# Patient Record
Sex: FEMALE | Race: WHITE | NOT HISPANIC OR LATINO | Employment: FULL TIME | URBAN - METROPOLITAN AREA
[De-identification: names, ages, dates, MRNs, and addresses within clinical notes are randomized per-mention and may not be internally consistent; named-entity substitution may affect disease eponyms.]

---

## 2024-06-05 ENCOUNTER — OCCMED (OUTPATIENT)
Dept: URGENT CARE | Facility: CLINIC | Age: 29
End: 2024-06-05
Payer: OTHER MISCELLANEOUS

## 2024-06-05 ENCOUNTER — APPOINTMENT (EMERGENCY)
Dept: CT IMAGING | Facility: HOSPITAL | Age: 29
End: 2024-06-05

## 2024-06-05 ENCOUNTER — HOSPITAL ENCOUNTER (EMERGENCY)
Facility: HOSPITAL | Age: 29
Discharge: HOME | End: 2024-06-05
Attending: EMERGENCY MEDICINE

## 2024-06-05 VITALS
TEMPERATURE: 97.8 F | WEIGHT: 135 LBS | DIASTOLIC BLOOD PRESSURE: 65 MMHG | HEIGHT: 58 IN | RESPIRATION RATE: 18 BRPM | HEART RATE: 74 BPM | BODY MASS INDEX: 28.34 KG/M2 | SYSTOLIC BLOOD PRESSURE: 119 MMHG | OXYGEN SATURATION: 100 %

## 2024-06-05 DIAGNOSIS — S09.90XA INJURY OF HEAD, INITIAL ENCOUNTER: Primary | ICD-10-CM

## 2024-06-05 DIAGNOSIS — S06.0XAA CONCUSSION: ICD-10-CM

## 2024-06-05 PROCEDURE — 99284 EMERGENCY DEPT VISIT MOD MDM: CPT | Performed by: EMERGENCY MEDICINE

## 2024-06-05 PROCEDURE — 99283 EMERGENCY DEPT VISIT LOW MDM: CPT

## 2024-06-05 PROCEDURE — G0382 LEV 3 HOSP TYPE B ED VISIT: HCPCS

## 2024-06-05 PROCEDURE — 70450 CT HEAD/BRAIN W/O DYE: CPT

## 2024-06-05 NOTE — Clinical Note
Noemí Middleton was seen and treated in our emergency department on 6/5/2024.                Diagnosis: head injury    Noemí  may return to work on return date.    She may return on this date: 06/06/2024         If you have any questions or concerns, please don't hesitate to call.      Frank Meyers MD    ______________________________           _______________          _______________  Hospital Representative                              Date                                Time

## 2024-06-06 NOTE — DISCHARGE INSTRUCTIONS
Follow up with jalen as discussed    A  personal message from Dr. Frank Meyers,  Thank you so much for allowing me to care for you today.    I pride myself in the care and attention I give all my patients.  I hope you were a witness to this tonight.   If for any reason your condition does not improve or worsens, or you have a question that was not answered during your visit you can feel free to text me on my personal phone #  # [phone].   I will answer to your message and continue your care past your emergency room visit.     Please understand that although you are being discharged because your condition has been deemed stable and able to be managed on an outpatient setting. However your condition may worsen as part of the natural progression of the illness/condition, if this occurs please come back to the emergency department for a repeat evaluation.

## 2024-06-06 NOTE — ED PROVIDER NOTES
History  Chief Complaint   Patient presents with    Head Injury     Hit in head with heavy tray, no loc      Last time at work around 1 AM patient was pushing a cart and a 3 pound metal tray fell from couple feet higher than her onto her head she has a frontal hematoma from that.  She feels a little lightheaded of course she has a headache.  She did have a sharp pain on her left ear that has since gotten better.  Not nauseous or vomiting.  No focal neurological signs or symptoms.  She did follow-up with occupational medicine and they sent her here to get a CAT scan.      History provided by:  Patient   used: No        None       Past Medical History:   Diagnosis Date    Polyglandular autoimmune syndrome, type 1 (HCC)        No past surgical history on file.    No family history on file.  I have reviewed and agree with the history as documented.    E-Cigarette/Vaping     E-Cigarette/Vaping Substances          Review of Systems   Constitutional:  Negative for chills and fever.   HENT:  Positive for ear pain. Negative for sore throat.    Eyes:  Negative for pain and visual disturbance.   Respiratory:  Negative for cough and shortness of breath.    Cardiovascular:  Negative for chest pain and palpitations.   Gastrointestinal:  Negative for abdominal pain and vomiting.   Genitourinary:  Negative for dysuria and hematuria.   Musculoskeletal:  Negative for arthralgias and back pain.   Skin:  Negative for color change and rash.   Neurological:  Positive for dizziness and headaches. Negative for seizures and syncope.   All other systems reviewed and are negative.      Physical Exam  Physical Exam  Vitals and nursing note reviewed.   Constitutional:       General: She is not in acute distress.     Appearance: Normal appearance. She is well-developed and normal weight.   HENT:      Head: Normocephalic and atraumatic.      Right Ear: Ear canal and external ear normal.      Left Ear: Ear canal and external  ear normal.      Ears:      Comments: R TM shows changes consistent with chronic infection but not acute      Nose: Nose normal.      Mouth/Throat:      Mouth: Mucous membranes are moist.   Eyes:      Extraocular Movements: Extraocular movements intact.      Conjunctiva/sclera: Conjunctivae normal.      Pupils: Pupils are equal, round, and reactive to light.   Cardiovascular:      Rate and Rhythm: Normal rate and regular rhythm.      Heart sounds: No murmur heard.  Pulmonary:      Effort: Pulmonary effort is normal. No respiratory distress.      Breath sounds: Normal breath sounds.   Abdominal:      General: Abdomen is flat.      Palpations: Abdomen is soft.      Tenderness: There is no abdominal tenderness.   Musculoskeletal:         General: No swelling.      Cervical back: Neck supple.   Skin:     General: Skin is warm and dry.      Capillary Refill: Capillary refill takes less than 2 seconds.   Neurological:      General: No focal deficit present.      Mental Status: She is alert and oriented to person, place, and time.   Psychiatric:         Mood and Affect: Mood normal.         Thought Content: Thought content normal.         Judgment: Judgment normal.         Vital Signs  ED Triage Vitals [06/05/24 1843]   Temperature Pulse Respirations Blood Pressure SpO2   97.8 °F (36.6 °C) 74 18 119/65 100 %      Temp Source Heart Rate Source Patient Position - Orthostatic VS BP Location FiO2 (%)   Temporal Monitor Sitting Left arm --      Pain Score       --           Vitals:    06/05/24 1843   BP: 119/65   Pulse: 74   Patient Position - Orthostatic VS: Sitting         Visual Acuity  Visual Acuity      Flowsheet Row Most Recent Value   L Pupil Size (mm) 3   R Pupil Size (mm) 3            ED Medications  Medications - No data to display    Diagnostic Studies  Results Reviewed       None                   CT head without contrast   Final Result by Luis Nash DO (06/05 2021)      No acute intracranial abnormality.  Extensive symmetric parenchymal calcifications are identified. These were described on outside MRI report dated 3/30/2012 and have been attributed to the patient's history of hypoparathyroidism.                  Workstation performed: [id number]                    Procedures  Procedures         ED Course                               SBIRT 22yo+      Flowsheet Row Most Recent Value   Initial Alcohol Screen: US AUDIT-C     1. How often do you have a drink containing alcohol? 0 Filed at: 06/05/2024 1844   2. How many drinks containing alcohol do you have on a typical day you are drinking?  0 Filed at: 06/05/2024 1844   3a. Male UNDER 65: How often do you have five or more drinks on one occasion? 0 Filed at: 06/05/2024 1844   3b. FEMALE Any Age, or MALE 65+: How often do you have 4 or more drinks on one occassion? 0 Filed at: 06/05/2024 1844   Audit-C Score 0 Filed at: 06/05/2024 1844   FREDA: How many times in the past year have you...    Used an illegal drug or used a prescription medication for non-medical reasons? Never Filed at: 06/05/2024 1844                      Medical Decision Making  Patient able to be cleared via [facility] rules:  1.) GCS 15 within 2 hours of injury.  2.) There is no open or depressed skull fracture on physical exam.  3.) No signs of basilar skull fracture.  4.) There was not more than 1 episode of vomiting.  5.) There is no retrograde amnesia to greater than 30 minutes prior to the event.  6.) No dangerous mechanism (fall greater than 3 feet or struck as pedetrian)  7.) Patient is less than 65 years old and older than 17.  8.) Patient is not on anticoagulants.      Problems Addressed:  Concussion: acute illness or injury  Injury of head, initial encounter: acute illness or injury    Amount and/or Complexity of Data Reviewed  Radiology: ordered.  Discussion of management or test interpretation with external provider(s): Because [facility] sent her here specifically for CT, I went ahead  and ordered one.              Disposition  Final diagnoses:   Injury of head, initial encounter   Concussion     Time reflects when diagnosis was documented in both MDM as applicable and the Disposition within this note       Time User Action Codes Description Comment    6/5/2024  8:32 PM Frank Meyers [S09.90XA] Injury of head, initial encounter     6/5/2024  8:32 PM Frank Meyers [S06.0XAA] Concussion           ED Disposition       ED Disposition   Discharge    Condition   Stable    Date/Time   Wed Jun 5, 2024 2032    Comment   Noemí Middleton discharge to home/self care.                   Follow-up Information    None         Patient's Medications    No medications on file       No discharge procedures on file.    PDMP Review       None            ED Provider  Electronically Signed by             Frank Meyers MD  06/05/24 2037

## 2024-06-07 ENCOUNTER — APPOINTMENT (OUTPATIENT)
Dept: URGENT CARE | Facility: CLINIC | Age: 29
End: 2024-06-07
Payer: OTHER MISCELLANEOUS

## 2024-06-07 PROCEDURE — 99213 OFFICE O/P EST LOW 20 MIN: CPT

## 2024-06-12 ENCOUNTER — APPOINTMENT (OUTPATIENT)
Dept: URGENT CARE | Facility: CLINIC | Age: 29
End: 2024-06-12
Payer: OTHER MISCELLANEOUS

## 2024-06-12 PROCEDURE — 99213 OFFICE O/P EST LOW 20 MIN: CPT

## 2024-06-18 ENCOUNTER — APPOINTMENT (OUTPATIENT)
Age: 29
End: 2024-06-18
Payer: OTHER MISCELLANEOUS

## 2024-06-18 PROCEDURE — 99213 OFFICE O/P EST LOW 20 MIN: CPT | Performed by: FAMILY MEDICINE

## 2024-07-02 ENCOUNTER — TELEPHONE (OUTPATIENT)
Age: 29
End: 2024-07-02

## 2024-07-02 NOTE — TELEPHONE ENCOUNTER
Hello Good Afternoon,       Patient is now scheduled with , 8/26/2024, [facility] .     case all  Case documents and notes have been scanned into chart and available for review.    Thank you in advance,     Renate

## 2024-07-03 ENCOUNTER — OFFICE VISIT (OUTPATIENT)
Dept: NEUROLOGY | Facility: CLINIC | Age: 29
End: 2024-07-03
Payer: OTHER MISCELLANEOUS

## 2024-07-03 VITALS
TEMPERATURE: 98.3 F | DIASTOLIC BLOOD PRESSURE: 78 MMHG | SYSTOLIC BLOOD PRESSURE: 128 MMHG | BODY MASS INDEX: 29.01 KG/M2 | HEIGHT: 58 IN | WEIGHT: 138.2 LBS

## 2024-07-03 DIAGNOSIS — H93.19 TINNITUS: ICD-10-CM

## 2024-07-03 DIAGNOSIS — S06.0X0A CONCUSSION WITHOUT LOSS OF CONSCIOUSNESS: ICD-10-CM

## 2024-07-03 DIAGNOSIS — R41.89 BRAIN FOG: ICD-10-CM

## 2024-07-03 DIAGNOSIS — G44.309 POST-TRAUMATIC HEADACHE: Primary | ICD-10-CM

## 2024-07-03 DIAGNOSIS — R47.89 WORD FINDING DIFFICULTY: ICD-10-CM

## 2024-07-03 PROCEDURE — 99205 OFFICE O/P NEW HI 60 MIN: CPT | Performed by: STUDENT IN AN ORGANIZED HEALTH CARE EDUCATION/TRAINING PROGRAM

## 2024-07-03 RX ORDER — PREDNISONE 20 MG/1
TABLET ORAL
Qty: 12 TABLET | Refills: 0 | Status: CANCELLED | OUTPATIENT
Start: 2024-07-03 | End: 2024-07-09

## 2024-07-03 RX ORDER — POTASSIUM CITRATE 10 MEQ/1
20 TABLET, EXTENDED RELEASE ORAL
COMMUNITY

## 2024-07-03 RX ORDER — MEMANTINE HYDROCHLORIDE 5 MG/1
TABLET ORAL
Qty: 120 TABLET | Refills: 4 | Status: SHIPPED | OUTPATIENT
Start: 2024-07-03

## 2024-07-03 RX ORDER — LYSINE 500 MG
500 TABLET ORAL DAILY
COMMUNITY

## 2024-07-03 RX ORDER — VALACYCLOVIR HYDROCHLORIDE 500 MG/1
500 TABLET, FILM COATED ORAL DAILY
COMMUNITY

## 2024-07-03 RX ORDER — CALCITRIOL 0.25 UG/1
0.25 CAPSULE, LIQUID FILLED ORAL 2 TIMES DAILY
COMMUNITY
Start: 2024-02-16

## 2024-07-03 RX ORDER — NAPROXEN 500 MG/1
500 TABLET ORAL 2 TIMES DAILY WITH MEALS
Qty: 14 TABLET | Refills: 0 | Status: SHIPPED | OUTPATIENT
Start: 2024-07-03 | End: 2024-07-10

## 2024-07-03 RX ORDER — CALCIUM CARBONATE 500 MG/1
1 TABLET, CHEWABLE ORAL 2 TIMES DAILY
COMMUNITY

## 2024-07-03 RX ORDER — AMILORIDE HYDROCHLORIDE AND HYDROCHLOROTHIAZIDE 5; 50 MG/1; MG/1
1 TABLET ORAL DAILY
COMMUNITY

## 2024-07-03 RX ORDER — CHLORAL HYDRATE 500 MG
1000 CAPSULE ORAL DAILY
COMMUNITY

## 2024-07-03 NOTE — PATIENT INSTRUCTIONS
Activity Plan:  General counseling as discussed regarding appropriate level of tolerable physical activity.      Gradual return to physical activity. It is ok to push through light symptoms, we just don't want to significantly exacerbate symptoms.     Additional Testing or Referrals:   - Occupational therapy  - ENT referral  - Labs: B12, Folate, TSH, Iron panel    Headache/migraine treatment:   Abortive medications (for immediate treatment of a headache): Ok to take ibuprofen or acetaminophen for headaches, but try to limit the amount and frequency that you are taking to avoid medication overuse/rebound headache. Ideally no more than 2-3 days per week.    Bridge  Naproxen Bridge  Take 500mg Naproxen twice daily for 7 days  - Avoid other NSAIDs during this time  - Take with food or milk    Prescription preventive medications for headaches/migraines   (to take every day to help prevent headaches - not to take at the time of headache):  Memantine  Week 1: 5mg in the evening  Week 2: 5mg in the morning and 5mg in the evening  Week 3: 5mg in the morning and 10mg in the evening  Week 4: 10mg in the morning and 10mg in the evening - continue going forward    This may help with:  [x] Headache prevention   [] Sleep   [] Mood/Emotion [x] Cognition/Brain fog    *Typically these types of medications take time untill you see the benefit, although some may see improvement in days, often it may take weeks, especially if the medication is being titrated up to a beneficial level. Please contact us if there are any concerns or questions regarding the medication.     Lifestyle Recommendations:  - Maintain good sleep hygiene.  Going to bed and waking up at consistent times, avoiding excessive daytime naps, avoiding caffeinated beverages in the evening, avoid excessive stimulation in the evening and generally using bed primarily for sleeping.  One hour before bedtime would recommend turning lights down lower, decreasing your activity  (may read quietly, listen to music at a low volume). When you get into bed, should eliminate all technology (no texting, emailing, playing with your phone, iPad or tablet in bed).  - Maintain good hydration. Drink  2L of fluid a day (4 typical small water bottles)  - Maintain good nutrition. In particular don't skip meals and eat balanced meals regularly.    Education and Follow-up  - Please contact us if any questions or concerns arise. Of course, try to protect yourself from head injuries, and if any new concerning symptoms or significant blow to the head or body go to the emergency department.  - Follow up in 3 months

## 2024-07-03 NOTE — PROGRESS NOTES
Review of Systems   Constitutional:  Negative for appetite change, fatigue and fever.   HENT:  Positive for ear pain (happens during PT) and tinnitus (ongoing since incident). Negative for hearing loss, trouble swallowing and voice change.    Eyes:  Positive for photophobia. Negative for pain and visual disturbance.   Respiratory: Negative.  Negative for shortness of breath.    Cardiovascular: Negative.  Negative for palpitations.   Gastrointestinal: Negative.  Negative for nausea and vomiting.   Endocrine: Negative.  Negative for cold intolerance.   Genitourinary: Negative.  Negative for dysuria, frequency and urgency.   Musculoskeletal:  Negative for back pain, gait problem, myalgias, neck pain and neck stiffness.   Skin: Negative.  Negative for rash.   Allergic/Immunologic: Negative.    Neurological:  Positive for headaches (daily; worse in light and on extertion). Negative for dizziness, tremors, seizures, syncope, facial asymmetry, speech difficulty, weakness, light-headedness and numbness.   Hematological: Negative.  Does not bruise/bleed easily.   Psychiatric/Behavioral:  Positive for agitation (irritable since incident) and confusion (brain fog). Negative for hallucinations and sleep disturbance.    All other systems reviewed and are negative.
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
## 2024-07-26 DIAGNOSIS — R47.89 WORD FINDING DIFFICULTY: ICD-10-CM

## 2024-07-26 DIAGNOSIS — R41.89 BRAIN FOG: ICD-10-CM

## 2024-07-26 DIAGNOSIS — S06.0X0A CONCUSSION WITHOUT LOSS OF CONSCIOUSNESS: ICD-10-CM

## 2024-07-26 DIAGNOSIS — G44.309 POST-TRAUMATIC HEADACHE: ICD-10-CM

## 2024-07-26 RX ORDER — MEMANTINE HYDROCHLORIDE 5 MG/1
TABLET ORAL
Qty: 360 TABLET | Refills: 1 | Status: SHIPPED | OUTPATIENT
Start: 2024-07-26

## 2024-09-27 ENCOUNTER — OFFICE VISIT (OUTPATIENT)
Age: 29
End: 2024-09-27
Payer: COMMERCIAL

## 2024-09-27 VITALS
RESPIRATION RATE: 18 BRPM | TEMPERATURE: 98.1 F | HEIGHT: 58 IN | HEART RATE: 94 BPM | DIASTOLIC BLOOD PRESSURE: 62 MMHG | OXYGEN SATURATION: 95 % | WEIGHT: 138 LBS | SYSTOLIC BLOOD PRESSURE: 118 MMHG | BODY MASS INDEX: 28.97 KG/M2

## 2024-09-27 DIAGNOSIS — B96.89 LOCALIZED BACTERIAL SKIN INFECTION: Primary | ICD-10-CM

## 2024-09-27 DIAGNOSIS — L08.9 LOCALIZED BACTERIAL SKIN INFECTION: Primary | ICD-10-CM

## 2024-09-27 PROCEDURE — G0382 LEV 3 HOSP TYPE B ED VISIT: HCPCS | Performed by: PHYSICIAN ASSISTANT

## 2024-09-27 RX ORDER — SULFAMETHOXAZOLE/TRIMETHOPRIM 800-160 MG
1 TABLET ORAL EVERY 12 HOURS SCHEDULED
Qty: 20 TABLET | Refills: 0 | Status: SHIPPED | OUTPATIENT
Start: 2024-09-27 | End: 2024-10-07

## 2024-09-27 NOTE — PROGRESS NOTES
[facility]        NAME: Noemí Middleton is a 28 y.o. female  : 1995    MRN: [id number]  DATE: 2024  TIME: 11:03 AM    Assessment and Plan   Localized bacterial skin infection [L08.9, B96.89]  1. Localized bacterial skin infection  sulfamethoxazole-trimethoprim (Bactrim DS) 800-160 mg per tablet          Patient has 3 areas of induration 1 in the left axilla, 1 in the left cheek lateral to the corner of the mouth but not involving the mouth itself.  There is no swelling inside the mouth or oropharynx.  No evidence of intraoral infection.  There is also area of induration in the mons pubis.  All of these are not fluctuant.  There is no significant erythema there is some mild swelling in the facial area however.  Based upon the appearance and palpation not appear to be amenable to I&D at this time there was no purulence able to be expressed from any of these areas.  Recommend treatment with warm compresses oral antibiotics at this time if these lesions worsen and swelling redness pain or she develops fever chills recommend evaluation in the ED.    The patient and/or parent/legal guardian verbalized understanding of exam findings and   Treatment plan. We engaged in the shared decision-making process and treatment options were   discussed at length with the patient.  All questions, concerns and complaints were answered and   addressed to the patient's' and/or parent/legal guardians's satisfaction.    Patient Instructions   There are no Patient Instructions on file for this visit.    Follow up with PCP in 3-5 days.  Proceed to  ER if symptoms worsen.    If tests are performed, our office will contact you with results only if   changes need to made to the care plan discussed with you at the visit.   [facility]     Chief Complaint     Chief Complaint   Patient presents with    Swelling     Patient complains of lump of left armpit, swelling, redness  and pus started 2 weeks ago. . Swelling of left cheek, started a day ago.          History of Present Illness       HPI  Patient is complaining of a lump of the left armpit along with swelling redness and pus this all began about 2 weeks ago she also complains that her left cheek is swelling this began about a day ago. Has history of polyglandular autoimmune syndrome type 1.  No fevers or chills.    Review of Systems   Review of Systems  All other related systems reviewed and are negative except as noted in HPI    Current Medications       Current Outpatient Medications:     AMILoride-hydrochlorothiazide (MODURETIC) 5-50 mg per tablet, Take 1 tablet by mouth daily, Disp: , Rfl:     calcitriol (Rocaltrol) 0.25 mcg capsule, Take 0.25 mcg by mouth 2 (two) times a day, Disp: , Rfl:     calcium carbonate (TUMS) 500 mg chewable tablet, Chew 1 tablet 2 (two) times a day, Disp: , Rfl:     L-Lysine 500 MG TABS, Take 500 mg by mouth in the morning, Disp: , Rfl:     Omega-3 Fatty Acids (fish oil) 1,000 mg, Take 1,000 mg by mouth daily, Disp: , Rfl:     Parathyroid Hormone, Recomb, (NATPARA SC), Take 75 mg by mouth in the morning, Disp: , Rfl:     potassium citrate (UROCIT-K) 10 mEq, Take 20 mEq by mouth 3 (three) times a day with meals, Disp: , Rfl:     sulfamethoxazole-trimethoprim (Bactrim DS) 800-160 mg per tablet, Take 1 tablet by mouth every 12 (twelve) hours for 10 days, Disp: 20 tablet, Rfl: 0    valACYclovir (VALTREX) 500 mg tablet, Take 500 mg by mouth daily, Disp: , Rfl:     memantine (NAMENDA) 5 mg tablet, TAKE 1 TABLET BY MOUTH DAILY FOR 1 WEEK, THEN 1 TAB TWICE A DAY FOR 1 WEEK, THEN 1 TAB IN THE MORNING AND 2 TABS IN THE EVENING FOR 1 WEEK THEN 2 TABS TWICE A DAY (Patient not taking: Reported on 9/27/2024), Disp: 360 tablet, Rfl: 1    naproxen (Naprosyn) 500 mg tablet, Take 1 tablet (500 mg total) by mouth 2 (two) times a day with meals for 7 days, Disp: 14 tablet, Rfl: 0    Current Allergies     Allergies as of  "09/27/2024    (No Known Allergies)            The following portions of the patient's history were reviewed and updated as appropriate: allergies, current medications, past family history, past medical history, past social history, past surgical history and problem list.     Past Medical History:   Diagnosis Date    Polyglandular autoimmune syndrome, type 1 (HCC)        No past surgical history on file.    No family history on file.      Medications have been verified.        Objective   /62   Pulse 94   Temp 98.1 °F (36.7 °C)   Resp 18   Ht 4' 10\" (1.473 m)   Wt 62.6 kg (138 lb)   SpO2 95%   BMI 28.84 kg/m²   No LMP recorded.       Physical Exam     Physical Exam  Constitutional:       General: She is not in acute distress.     Appearance: She is well-developed.   HENT:      Head: Normocephalic and atraumatic.   Eyes:      General: No scleral icterus.     Conjunctiva/sclera: Conjunctivae normal.   Neck:      Trachea: No tracheal deviation.   Pulmonary:      Effort: Pulmonary effort is normal. No respiratory distress.      Breath sounds: No stridor.   Musculoskeletal:      Cervical back: Normal range of motion.   Skin:     General: Skin is warm and dry.      Findings: Erythema and lesion present.      Comments: There is an area of subcutaneous induration and mild erythema in the left axilla, the left mons pubis, also left cheek there is a small ulceration there but there is no expressible purulence and no fluctuance at any of the sites. Renee Teixeira RN present for this physical exam   Neurological:      Mental Status: She is alert and oriented to person, place, and time.   Psychiatric:         Behavior: Behavior normal.         Ortho Exam        Procedures  No Procedures performed today        Note: Portions of this record may have been created with voice recognition software. Occasional wrong word or \"sound a like\" substitutions may have occurred due to the inherent limitations of voice " recognition software. Please read the chart carefully and recognize, using context, where substitutions have occurred.*

## 2024-10-04 ENCOUNTER — TELEPHONE (OUTPATIENT)
Age: 29
End: 2024-10-04

## 2024-10-04 NOTE — TELEPHONE ENCOUNTER
Amairani  SMITA called to [facility] patients FU; scheduled 10/9/24 8:30 AM DO ANTWAN Nieves.  Amairani also requested LOV notes 7/3/24 and work note to be faxed.    Please assist,     Thank you    Amairani Dunbar [facility] #[phone]  FAX #[phone]

## 2025-02-08 ENCOUNTER — OFFICE VISIT (OUTPATIENT)
Age: 30
End: 2025-02-08
Payer: COMMERCIAL

## 2025-02-08 VITALS
WEIGHT: 138.6 LBS | DIASTOLIC BLOOD PRESSURE: 74 MMHG | TEMPERATURE: 97.7 F | SYSTOLIC BLOOD PRESSURE: 125 MMHG | BODY MASS INDEX: 28.97 KG/M2 | HEART RATE: 88 BPM | RESPIRATION RATE: 18 BRPM | OXYGEN SATURATION: 97 %

## 2025-02-08 DIAGNOSIS — J01.00 ACUTE NON-RECURRENT MAXILLARY SINUSITIS: Primary | ICD-10-CM

## 2025-02-08 PROCEDURE — G0382 LEV 3 HOSP TYPE B ED VISIT: HCPCS | Performed by: PHYSICIAN ASSISTANT

## 2025-02-08 RX ORDER — AMOXICILLIN 500 MG/1
500 CAPSULE ORAL EVERY 12 HOURS SCHEDULED
Qty: 14 CAPSULE | Refills: 0 | Status: SHIPPED | OUTPATIENT
Start: 2025-02-08 | End: 2025-02-15

## 2025-02-08 NOTE — PROGRESS NOTES
[facility]        NAME: Noemí Middleton is a 29 y.o. female  : 1995    MRN: [id number]  DATE: 2025  TIME: 3:32 PM    Assessment and Plan   Acute non-recurrent maxillary sinusitis [J01.00]  1. Acute non-recurrent maxillary sinusitis  amoxicillin (AMOXIL) 500 mg capsule            Patient Instructions       Follow up with PCP in 3-5 days.  Proceed to  ER if symptoms worsen.    If tests are performed, our office will contact you with results only if changes need to made to the care plan discussed with you at the visit. [facility].    Chief Complaint     Chief Complaint   Patient presents with    Sinusitis     Symptoms started two weeks ago. C/o Facial/chest congestion, green phlegm and mild cough.          History of Present Illness       Sinusitis  This is a new problem. The current episode started 1 to 4 weeks ago. The problem has been gradually worsening since onset. There has been no fever. Associated symptoms include congestion, coughing, headaches and sinus pressure. Pertinent negatives include no diaphoresis, ear pain, neck pain, shortness of breath, sneezing, sore throat or swollen glands. Past treatments include oral decongestants. The treatment provided no relief.       Review of Systems   Review of Systems   Constitutional:  Negative for activity change, appetite change, diaphoresis and fever.   HENT:  Positive for congestion and sinus pressure. Negative for ear pain, sneezing and sore throat.    Respiratory:  Positive for cough. Negative for shortness of breath.    Gastrointestinal:  Negative for nausea and vomiting.   Musculoskeletal:  Negative for neck pain.   Skin:  Negative for rash.   Neurological:  Positive for headaches. Negative for light-headedness.         Current Medications       Current Outpatient Medications:     amoxicillin (AMOXIL) 500 mg capsule, Take 1 capsule (500 mg total) by mouth every 12 (twelve) hours for 7  days, Disp: 14 capsule, Rfl: 0    AMILoride-hydrochlorothiazide (MODURETIC) 5-50 mg per tablet, Take 1 tablet by mouth daily, Disp: , Rfl:     calcitriol (Rocaltrol) 0.25 mcg capsule, Take 0.25 mcg by mouth 2 (two) times a day, Disp: , Rfl:     calcium carbonate (TUMS) 500 mg chewable tablet, Chew 1 tablet 2 (two) times a day, Disp: , Rfl:     L-Lysine 500 MG TABS, Take 500 mg by mouth in the morning, Disp: , Rfl:     memantine (NAMENDA) 5 mg tablet, TAKE 1 TABLET BY MOUTH DAILY FOR 1 WEEK, THEN 1 TAB TWICE A DAY FOR 1 WEEK, THEN 1 TAB IN THE MORNING AND 2 TABS IN THE EVENING FOR 1 WEEK THEN 2 TABS TWICE A DAY (Patient not taking: Reported on 9/27/2024), Disp: 360 tablet, Rfl: 1    naproxen (Naprosyn) 500 mg tablet, Take 1 tablet (500 mg total) by mouth 2 (two) times a day with meals for 7 days, Disp: 14 tablet, Rfl: 0    Omega-3 Fatty Acids (fish oil) 1,000 mg, Take 1,000 mg by mouth daily, Disp: , Rfl:     Parathyroid Hormone, Recomb, (NATPARA SC), Take 75 mg by mouth in the morning, Disp: , Rfl:     potassium citrate (UROCIT-K) 10 mEq, Take 20 mEq by mouth 3 (three) times a day with meals, Disp: , Rfl:     valACYclovir (VALTREX) 500 mg tablet, Take 500 mg by mouth daily, Disp: , Rfl:     Current Allergies     Allergies as of 02/08/2025    (No Known Allergies)            The following portions of the patient's history were reviewed and updated as appropriate: allergies, current medications, past family history, past medical history, past social history, past surgical history and problem list.     Past Medical History:   Diagnosis Date    Polyglandular autoimmune syndrome, type 1 (HCC)        History reviewed. No pertinent surgical history.    History reviewed. No pertinent family history.      Medications have been verified.        Objective   /74   Pulse 88   Temp 97.7 °F (36.5 °C)   Resp 18   Wt 62.9 kg (138 lb 9.6 oz)   SpO2 97%   BMI 28.97 kg/m²        Physical Exam     Physical  Exam  Constitutional:       General: She is not in acute distress.     Appearance: Normal appearance. She is normal weight. She is not ill-appearing, toxic-appearing or diaphoretic.   HENT:      Head: Normocephalic and atraumatic.      Right Ear: Tympanic membrane, ear canal and external ear normal.      Left Ear: Tympanic membrane, ear canal and external ear normal.      Nose: Congestion (Sclerae sinus tenderness on percussion bilateral.) and rhinorrhea present.      Mouth/Throat:      Mouth: Mucous membranes are moist.      Pharynx: Posterior oropharyngeal erythema present. No oropharyngeal exudate.   Eyes:      General: No scleral icterus.     Extraocular Movements: Extraocular movements intact.      Conjunctiva/sclera: Conjunctivae normal.      Pupils: Pupils are equal, round, and reactive to light.   Cardiovascular:      Rate and Rhythm: Normal rate and regular rhythm.      Pulses: Normal pulses.      Heart sounds: Normal heart sounds.   Pulmonary:      Effort: Pulmonary effort is normal. No respiratory distress.      Breath sounds: Normal breath sounds.   Musculoskeletal:         General: Normal range of motion.      Cervical back: Normal range of motion and neck supple. No tenderness.   Lymphadenopathy:      Cervical: No cervical adenopathy.   Skin:     General: Skin is warm and dry.      Capillary Refill: Capillary refill takes less than 2 seconds.   Neurological:      General: No focal deficit present.      Mental Status: She is alert and oriented to person, place, and time.      Coordination: Coordination normal.      Gait: Gait normal.   Psychiatric:         Mood and Affect: Mood normal.         Behavior: Behavior normal.

## 2025-02-08 NOTE — PATIENT INSTRUCTIONS
"Patient Education     Sinusitis in adults   The Basics   Written by the doctors and editors at [facility]   What is sinusitis? -- Sinusitis is a condition that can cause a stuffy nose, pain in the face, and discharge or \"mucus\" from the nose.  The sinuses are hollow areas in the bones of the face (figure 1). They have a thin lining that normally makes a small amount of mucus. When this lining gets irritated or infected, it swells and makes extra mucus. This causes symptoms.  Sinusitis usually happens after a person gets sick with a cold. The germs causing the cold can infect the sinuses, too. Sometimes, other germs can be the cause of the infection. Often, a person feels like their cold is getting better. But then, they get sinusitis and begin to feel sick again.  What are the symptoms of sinusitis? -- Common symptoms of sinusitis include:   Stuffy or blocked nose   Thick white, yellow, or green discharge from the nose   Pain in the teeth   Pain or pressure in the face - This often feels worse when a person bends forward.  People with sinusitis can also have other symptoms, such as:   Fever   Cough   Trouble smelling   Ear pressure or fullness   Headache   Bad breath   Feeling tired  Most of the time, symptoms start to improve in 7 to 10 days.  Should I see a doctor or nurse? -- See your doctor or nurse if your symptoms last more than 10 days, or if your symptoms first get better but then get worse.  Rarely, sinusitis can lead to serious problems. See your doctor or nurse right away (do not wait 10 days) if you have:   Fever higher than 102°F (38.9°C)   Sudden and severe pain in the face and head   Trouble seeing, or seeing double   Trouble thinking clearly   Swelling or redness around 1 or both eyes   Stiff neck  Is there anything I can do on my own to feel better? -- Yes. To help with your symptoms, you can:   Take an over-the-counter pain reliever to reduce the pain.   Rinse your nose and sinuses with salt water a " "few times a day - Ask your doctor or nurse about the best way to do this.   Drink plenty of fluids - Staying hydrated might help to thin the mucus and make it drain more easily.  Your doctor might also recommend a steroid nose spray to reduce the swelling in your nose, especially if you have allergies. Talk to your doctor if you are thinking of using a steroid spray.  How is sinusitis treated? -- Most of the time, sinusitis does not need to be treated with antibiotic medicines. This is because most sinusitis is caused by viruses, not bacteria, and antibiotics do not kill viruses. In fact, even sinusitis caused by bacteria will usually get better on its own without antibiotics.  Some people with sinusitis do need treatment with antibiotics. If your symptoms have not improved after 10 days, ask your doctor if you should take antibiotics. They might recommend that you wait 1 more week to see if your symptoms improve. But if you have symptoms such as a fever or a lot of pain, they might prescribe antibiotics. If you do get antibiotics, follow all of your doctor's instructions about taking them.  What if my symptoms do not get better? -- If your symptoms do not get better, talk with your doctor or nurse. They might order tests to figure out why you still have symptoms. These can include:   CT scan or other imaging tests - Imaging tests create pictures of the inside of the body.   A test to look inside the sinuses - For this test, a doctor puts a thin tube with a camera on the end into the nose and up into the sinuses.  Some people get a lot of sinus infections or have symptoms that last at least 3 months. These people can have a different type of sinusitis called \"chronic sinusitis.\" Chronic sinusitis can be caused by different things. For example, some people have growths inside their nose or sinuses that are called \"polyps.\" Other people have allergies that cause their symptoms.  Chronic sinusitis can be treated in " different ways. If you have chronic sinusitis, talk with your doctor about which treatments are right for you.  All topics are updated as new evidence becomes available and our peer review process is complete.  This topic retrieved from [employer] on: Feb 28, 2024.  Topic 67043 Version 21.0  Release: 32.2.4 - C32.58  © 2024 UpToDate, Inc. and/or its affiliates. All rights reserved.  figure 1: Sinuses of the face     The sinuses are hollow areas in the bones of the face. This drawing shows where the sinuses are, from the side and front views. There are 4 pairs of sinuses, named for the bones around them: sphenoid, frontal, ethmoid, and maxillary.  Graphic 623290 Version 3.0  Consumer Information Use and Disclaimer   Disclaimer: This generalized information is a limited summary of diagnosis, treatment, and/or medication information. It is not meant to be comprehensive and should be used as a tool to help the user understand and/or assess potential diagnostic and treatment options. It does NOT include all information about conditions, treatments, medications, side effects, or risks that may apply to a specific patient. It is not intended to be medical advice or a substitute for the medical advice, diagnosis, or treatment of a health care provider based on the health care provider's examination and assessment of a patient's specific and unique circumstances. Patients must speak with a health care provider for complete information about their health, medical questions, and treatment options, including any risks or benefits regarding use of medications. This information does not endorse any treatments or medications as safe, effective, or approved for treating a specific patient. UpToDate, Inc. and its affiliates disclaim any warranty or liability relating to this information or the use thereof.The use of this information is governed by the Terms of Use, available at  https://www.[employer].com/en/know/clinical-effectiveness-terms. 2024© [employer] and its affiliates and/or licensors. All rights reserved.  Copyright   © 2024 [employer] and/or its affiliates. All rights reserved.

## 2025-05-04 ENCOUNTER — OFFICE VISIT (OUTPATIENT)
Age: 30
End: 2025-05-04
Payer: COMMERCIAL

## 2025-05-04 VITALS
SYSTOLIC BLOOD PRESSURE: 145 MMHG | HEART RATE: 80 BPM | WEIGHT: 146.4 LBS | BODY MASS INDEX: 30.6 KG/M2 | TEMPERATURE: 97.5 F | RESPIRATION RATE: 18 BRPM | OXYGEN SATURATION: 96 % | DIASTOLIC BLOOD PRESSURE: 66 MMHG

## 2025-05-04 DIAGNOSIS — B37.31 CANDIDA VAGINITIS: ICD-10-CM

## 2025-05-04 DIAGNOSIS — N30.01 ACUTE CYSTITIS WITH HEMATURIA: Primary | ICD-10-CM

## 2025-05-04 DIAGNOSIS — Z20.2 EXPOSURE TO SEXUALLY TRANSMITTED INFECTION: ICD-10-CM

## 2025-05-04 LAB
SL AMB  POCT GLUCOSE, UA: ABNORMAL
SL AMB LEUKOCYTE ESTERASE,UA: ABNORMAL
SL AMB POCT BILIRUBIN,UA: ABNORMAL
SL AMB POCT BLOOD,UA: ABNORMAL
SL AMB POCT CLARITY,UA: ABNORMAL
SL AMB POCT COLOR,UA: YELLOW
SL AMB POCT KETONES,UA: ABNORMAL
SL AMB POCT NITRITE,UA: ABNORMAL
SL AMB POCT PH,UA: 8
SL AMB POCT SPECIFIC GRAVITY,UA: 1
SL AMB POCT URINE HCG: NORMAL
SL AMB POCT URINE PROTEIN: ABNORMAL
SL AMB POCT UROBILINOGEN: 0.2

## 2025-05-04 PROCEDURE — 87491 CHLMYD TRACH DNA AMP PROBE: CPT | Performed by: PHYSICIAN ASSISTANT

## 2025-05-04 PROCEDURE — 87106 FUNGI IDENTIFICATION YEAST: CPT | Performed by: PHYSICIAN ASSISTANT

## 2025-05-04 PROCEDURE — 81025 URINE PREGNANCY TEST: CPT | Performed by: PHYSICIAN ASSISTANT

## 2025-05-04 PROCEDURE — 87086 URINE CULTURE/COLONY COUNT: CPT | Performed by: PHYSICIAN ASSISTANT

## 2025-05-04 PROCEDURE — 87591 N.GONORRHOEAE DNA AMP PROB: CPT | Performed by: PHYSICIAN ASSISTANT

## 2025-05-04 PROCEDURE — G0383 LEV 4 HOSP TYPE B ED VISIT: HCPCS | Performed by: PHYSICIAN ASSISTANT

## 2025-05-04 PROCEDURE — 81002 URINALYSIS NONAUTO W/O SCOPE: CPT | Performed by: PHYSICIAN ASSISTANT

## 2025-05-04 RX ORDER — SULFAMETHOXAZOLE AND TRIMETHOPRIM 800; 160 MG/1; MG/1
1 TABLET ORAL EVERY 12 HOURS SCHEDULED
Qty: 14 TABLET | Refills: 0 | Status: SHIPPED | OUTPATIENT
Start: 2025-05-04 | End: 2025-05-11

## 2025-05-04 RX ORDER — FLUCONAZOLE 150 MG/1
TABLET ORAL
COMMUNITY
Start: 2025-05-03

## 2025-05-04 RX ORDER — FLUCONAZOLE 150 MG/1
150 TABLET ORAL ONCE
Qty: 1 TABLET | Refills: 0 | Status: SHIPPED | OUTPATIENT
Start: 2025-05-04 | End: 2025-05-04

## 2025-05-04 RX ORDER — PALOPEGTERIPARATIDE 294 UG/.98ML
18 INJECTION, SOLUTION SUBCUTANEOUS DAILY
COMMUNITY
Start: 2025-01-13

## 2025-05-04 NOTE — PATIENT INSTRUCTIONS
Please take and finish the course of antibiotics given unless otherwise directed   Your symptoms should improve within 24-48 hours of antibiotic use  A urine culture will be sent out -- if any changes to the treatment plan need to be made, our office will give you a call to let you know  Other measures to help with treatment   Drink plenty of fluids to flush out the urinary system   Use the restroom   Preventing UTI's   Females - wiping from front to back after using the restroom to prevent cross contamination of fecal bacteria into the urinary tract   Females - urinating within 5-10 minutes after sexual intercourse   Females and males - taking a cranberry supplement, probiotics, vitamin C, and/or D-mannose may help in certain cases   When to seek further medical attention   Symptoms continue beyond 24-48 hours of taking antibiotics   You develop fevers, chills, low back pain, or any other severe symptoms after starting antibiotics   You have any adverse reaction to the antibiotic prescribed     Abstinence or protection until test results return

## 2025-05-04 NOTE — PROGRESS NOTES
[facility]        NAME: Noemí Middleton is a 29 y.o. female  : 1995    MRN: [id number]  DATE: May 4, 2025  TIME: 10:12 AM      Assessment and Plan     Acute cystitis with hematuria [N30.01]  1. Acute cystitis with hematuria  POCT urine dip    Urine culture    Urine culture    sulfamethoxazole-trimethoprim (BACTRIM DS) 800-160 mg per tablet      2. Candida vaginitis  fluconazole (DIFLUCAN) 150 mg tablet      3. Exposure to sexually transmitted infection  Chlamydia/GC amplified DNA by PCR          POC Testing Results        Note:       Patient Instructions     Patient Instructions     Please take and finish the course of antibiotics given unless otherwise directed   Your symptoms should improve within 24-48 hours of antibiotic use  A urine culture will be sent out -- if any changes to the treatment plan need to be made, our office will give you a call to let you know  Other measures to help with treatment   Drink plenty of fluids to flush out the urinary system   Use the restroom   Preventing UTI's   Females - wiping from front to back after using the restroom to prevent cross contamination of fecal bacteria into the urinary tract   Females - urinating within 5-10 minutes after sexual intercourse   Females and males - taking a cranberry supplement, probiotics, vitamin C, and/or D-mannose may help in certain cases   When to seek further medical attention   Symptoms continue beyond 24-48 hours of taking antibiotics   You develop fevers, chills, low back pain, or any other severe symptoms after starting antibiotics   You have any adverse reaction to the antibiotic prescribed     Abstinence or protection until test results return     Follow up with primary care provider.   Go to ER if symptoms worsen.    Chief Complaint     Chief Complaint   Patient presents with    Possible UTI     White discharge started yesterday. C/o abdominal pain and frequent urination.         History of Present Illness     Pt  reports having some itching after completing Augmentin earlier last week due to URI prescribed by PCP. She reports yesterday PCP called in Diflucan but she reports she misplaced second dose. She also reports dysuria, hematuria and urinary frequency with abdominal pain and bloating as well as nausea and back pain. 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 She denies any fever, vomiting or bloody diarrhea. Pt reports unprotected sex.         Review of Systems     Review of Systems   Constitutional:  Negative for fever.   Gastrointestinal:  Positive for abdominal distention, abdominal pain, diarrhea and nausea. Negative for blood in stool and vomiting.   Genitourinary:  Positive for dysuria, flank pain, frequency, hematuria, urgency and vaginal discharge. Negative for pelvic pain, vaginal bleeding and vaginal pain.   Skin:  Negative for rash.   Neurological:  Negative for syncope.         Current Medications       Current Outpatient Medications:     fluconazole (DIFLUCAN) 150 mg tablet, , Disp: , Rfl:     fluconazole (DIFLUCAN) 150 mg tablet, Take 1 tablet (150 mg total) by mouth once for 1 dose, Disp: 1 tablet, Rfl: 0    sulfamethoxazole-trimethoprim (BACTRIM DS) 800-160 mg per tablet, Take 1 tablet by mouth every 12 (twelve) hours for 7 days, Disp: 14 tablet, Rfl: 0    Yorvipath 294 MCG/0.98ML SOPN, Inject 18 mcg under the skin daily, Disp: , Rfl:     AMILoride-hydrochlorothiazide (MODURETIC) 5-50 mg per tablet, Take 1 tablet by mouth daily (Patient not taking: Reported on 5/4/2025), Disp: , Rfl:     calcitriol (Rocaltrol) 0.25 mcg capsule, Take 0.25 mcg by mouth 2 (two) times a day, Disp: , Rfl:     calcium carbonate (TUMS) 500 mg chewable tablet, Chew 1 tablet 2 (two) times a day, Disp: , Rfl:     L-Lysine 500 MG TABS, Take 500 mg by mouth in the morning, Disp: , Rfl:     memantine (NAMENDA) 5 mg tablet, TAKE 1 TABLET BY MOUTH DAILY FOR 1 WEEK,  THEN 1 TAB TWICE A DAY FOR 1 WEEK, THEN 1 TAB IN THE MORNING AND 2 TABS IN THE EVENING FOR 1 WEEK THEN 2 TABS TWICE A DAY (Patient not taking: Reported on 9/27/2024), Disp: 360 tablet, Rfl: 1    naproxen (Naprosyn) 500 mg tablet, Take 1 tablet (500 mg total) by mouth 2 (two) times a day with meals for 7 days, Disp: 14 tablet, Rfl: 0    Omega-3 Fatty Acids (fish oil) 1,000 mg, Take 1,000 mg by mouth daily, Disp: , Rfl:     Parathyroid Hormone, Recomb, (NATPARA SC), Take 75 mg by mouth in the morning, Disp: , Rfl:     potassium citrate (UROCIT-K) 10 mEq, Take 20 mEq by mouth 3 (three) times a day with meals, Disp: , Rfl:     valACYclovir (VALTREX) 500 mg tablet, Take 500 mg by mouth daily, Disp: , Rfl:     Current Allergies     Allergies as of 05/04/2025    (No Known Allergies)              The following portions of the patient's history were reviewed and updated as appropriate: allergies, current medications, past family history, past medical history, past social history, past surgical history, and problem list.     Past Medical History:   Diagnosis Date    Polyglandular autoimmune syndrome, type 1 (HCC)        History reviewed. No pertinent surgical history.    History reviewed. No pertinent family history.      Medications have been verified.        Objective     /66   Pulse 80   Temp 97.5 °F (36.4 °C)   Resp 18   Wt 66.4 kg (146 lb 6.4 oz)   SpO2 96%   BMI 30.60 kg/m²   No LMP recorded.         Physical Exam     Physical Exam  Constitutional:       General: She is not in acute distress.     Appearance: Normal appearance. She is not ill-appearing, toxic-appearing or diaphoretic.   HENT:      Head: Normocephalic and atraumatic.   Eyes:      General: No scleral icterus.        Right eye: No discharge.         Left eye: No discharge.      Extraocular Movements: Extraocular movements intact.      Conjunctiva/sclera: Conjunctivae normal.      Pupils: Pupils are equal, round, and reactive to light.    Cardiovascular:      Rate and Rhythm: Normal rate and regular rhythm.      Heart sounds: Normal heart sounds. No murmur heard.     No friction rub. No gallop.   Pulmonary:      Effort: Pulmonary effort is normal. No respiratory distress.      Breath sounds: Normal breath sounds. No stridor. No wheezing, rhonchi or rales.   Abdominal:      General: Abdomen is flat. Bowel sounds are normal.      Palpations: Abdomen is soft. There is no shifting dullness, fluid wave, hepatomegaly, splenomegaly, mass or pulsatile mass.      Tenderness: There is generalized abdominal tenderness. There is right CVA tenderness and left CVA tenderness. There is no guarding or rebound.   Musculoskeletal:      Cervical back: Normal range of motion and neck supple. No rigidity.   Skin:     General: Skin is warm and dry.      Coloration: Skin is not jaundiced or pale.      Findings: No bruising, erythema, lesion or rash.   Neurological:      General: No focal deficit present.      Mental Status: She is alert and oriented to person, place, and time. Mental status is at baseline.   Psychiatric:         Mood and Affect: Mood normal.         Behavior: Behavior normal.         Thought Content: Thought content normal.         Judgment: Judgment normal.

## 2025-05-05 ENCOUNTER — RESULTS FOLLOW-UP (OUTPATIENT)
Age: 30
End: 2025-05-05

## 2025-05-05 LAB
C TRACH DNA SPEC QL NAA+PROBE: NEGATIVE
N GONORRHOEA DNA SPEC QL NAA+PROBE: NEGATIVE

## 2025-05-05 NOTE — TELEPHONE ENCOUNTER
Was called and message left on voicemail indicating that her recent testing for thus far, negative.  no details were left as to which test however to return my call if wishing to further discuss.

## 2025-05-06 LAB — BACTERIA UR CULT: ABNORMAL

## 2025-05-13 ENCOUNTER — TELEPHONE (OUTPATIENT)
Age: 30
End: 2025-05-13

## 2025-05-13 NOTE — TELEPHONE ENCOUNTER
Left message for pt regarding possibly establishing care with [facility] PCP    please schedule pt with any provider to establish with PCP as NP pt